# Patient Record
Sex: FEMALE | Race: WHITE | NOT HISPANIC OR LATINO | Employment: FULL TIME | URBAN - METROPOLITAN AREA
[De-identification: names, ages, dates, MRNs, and addresses within clinical notes are randomized per-mention and may not be internally consistent; named-entity substitution may affect disease eponyms.]

---

## 2020-07-19 ENCOUNTER — APPOINTMENT (EMERGENCY)
Dept: RADIOLOGY | Facility: HOSPITAL | Age: 55
End: 2020-07-19

## 2020-07-19 ENCOUNTER — HOSPITAL ENCOUNTER (EMERGENCY)
Facility: HOSPITAL | Age: 55
Discharge: HOME | End: 2020-07-19
Attending: EMERGENCY MEDICINE | Admitting: EMERGENCY MEDICINE

## 2020-07-19 VITALS
HEIGHT: 60 IN | DIASTOLIC BLOOD PRESSURE: 78 MMHG | RESPIRATION RATE: 14 BRPM | SYSTOLIC BLOOD PRESSURE: 126 MMHG | BODY MASS INDEX: 27.14 KG/M2 | WEIGHT: 138.23 LBS | HEART RATE: 68 BPM | TEMPERATURE: 98.3 F | OXYGEN SATURATION: 99 %

## 2020-07-19 DIAGNOSIS — R07.9 CHEST PAIN: Primary | ICD-10-CM

## 2020-07-19 LAB
ALBUMIN SERPL BCP-MCNC: 4.8 G/DL (ref 3.5–5.7)
ALP SERPL-CCNC: 49 U/L (ref 40–150)
ALT SERPL W P-5'-P-CCNC: 10 U/L (ref 7–52)
ANION GAP SERPL CALCULATED.3IONS-SCNC: 7 MMOL/L (ref 4–13)
AST SERPL W P-5'-P-CCNC: 12 U/L (ref 13–39)
BASOPHILS # BLD AUTO: 0 THOUSANDS/ΜL (ref 0–0.1)
BASOPHILS NFR BLD AUTO: 1 % (ref 0–2)
BILIRUB SERPL-MCNC: 0.6 MG/DL (ref 0.2–1)
BUN SERPL-MCNC: 12 MG/DL (ref 7–25)
CALCIUM SERPL-MCNC: 9.6 MG/DL (ref 8.6–10.5)
CHLORIDE SERPL-SCNC: 107 MMOL/L (ref 98–107)
CO2 SERPL-SCNC: 27 MMOL/L (ref 21–31)
CREAT SERPL-MCNC: 0.85 MG/DL (ref 0.6–1.2)
EOSINOPHIL # BLD AUTO: 0.2 THOUSAND/ΜL (ref 0–0.61)
EOSINOPHIL NFR BLD AUTO: 3 % (ref 0–5)
ERYTHROCYTE [DISTWIDTH] IN BLOOD BY AUTOMATED COUNT: 13.2 % (ref 11.5–14.5)
GFR SERPL CREATININE-BSD FRML MDRD: 79 ML/MIN/1.73SQ M
GLUCOSE SERPL-MCNC: 89 MG/DL (ref 65–99)
HCT VFR BLD AUTO: 42.8 % (ref 42–47)
HGB BLD-MCNC: 14.3 G/DL (ref 12–16)
LYMPHOCYTES # BLD AUTO: 3.1 THOUSANDS/ΜL (ref 0.6–4.47)
LYMPHOCYTES NFR BLD AUTO: 35 % (ref 21–51)
MCH RBC QN AUTO: 32.3 PG (ref 26–34)
MCHC RBC AUTO-ENTMCNC: 33.4 G/DL (ref 31–37)
MCV RBC AUTO: 97 FL (ref 81–99)
MONOCYTES # BLD AUTO: 0.5 THOUSAND/ΜL (ref 0.17–1.22)
MONOCYTES NFR BLD AUTO: 6 % (ref 2–12)
NEUTROPHILS # BLD AUTO: 5 THOUSANDS/ΜL (ref 1.4–6.5)
NEUTS SEG NFR BLD AUTO: 56 % (ref 42–75)
PLATELET # BLD AUTO: 253 THOUSANDS/UL (ref 149–390)
PMV BLD AUTO: 8.1 FL (ref 8.6–11.7)
POTASSIUM SERPL-SCNC: 4 MMOL/L (ref 3.5–5.5)
PROT SERPL-MCNC: 7.2 G/DL (ref 6.4–8.9)
RBC # BLD AUTO: 4.42 MILLION/UL (ref 3.9–5.2)
SODIUM SERPL-SCNC: 141 MMOL/L (ref 134–143)
TROPONIN I SERPL-MCNC: <0.03 NG/ML
WBC # BLD AUTO: 8.9 THOUSAND/UL (ref 4.8–10.8)

## 2020-07-19 PROCEDURE — 84484 ASSAY OF TROPONIN QUANT: CPT | Performed by: EMERGENCY MEDICINE

## 2020-07-19 PROCEDURE — 36415 COLL VENOUS BLD VENIPUNCTURE: CPT

## 2020-07-19 PROCEDURE — 85025 COMPLETE CBC W/AUTO DIFF WBC: CPT | Performed by: EMERGENCY MEDICINE

## 2020-07-19 PROCEDURE — 80053 COMPREHEN METABOLIC PANEL: CPT | Performed by: EMERGENCY MEDICINE

## 2020-07-19 PROCEDURE — 99285 EMERGENCY DEPT VISIT HI MDM: CPT | Performed by: PHYSICIAN ASSISTANT

## 2020-07-19 PROCEDURE — 93005 ELECTROCARDIOGRAM TRACING: CPT

## 2020-07-19 PROCEDURE — 99285 EMERGENCY DEPT VISIT HI MDM: CPT

## 2020-07-19 PROCEDURE — 71045 X-RAY EXAM CHEST 1 VIEW: CPT

## 2020-07-19 RX ORDER — CYCLOBENZAPRINE HCL 10 MG
10 TABLET ORAL ONCE
Status: COMPLETED | OUTPATIENT
Start: 2020-07-19 | End: 2020-07-19

## 2020-07-19 RX ADMIN — CYCLOBENZAPRINE HYDROCHLORIDE 10 MG: 10 TABLET, FILM COATED ORAL at 20:58

## 2020-07-20 LAB
ATRIAL RATE: 64 BPM
P AXIS: 57 DEGREES
PR INTERVAL: 152 MS
QRS AXIS: 16 DEGREES
QRSD INTERVAL: 78 MS
QT INTERVAL: 446 MS
QTC INTERVAL: 460 MS
T WAVE AXIS: 35 DEGREES
VENTRICULAR RATE: 64 BPM

## 2020-07-20 PROCEDURE — 93010 ELECTROCARDIOGRAM REPORT: CPT | Performed by: INTERNAL MEDICINE

## 2020-07-20 NOTE — ED PROVIDER NOTES
History  Chief Complaint   Patient presents with    Chest Pain     According to the patient, she has had chest pain that radiates down left arm started around 11 am   Patient denies nausea  episodes of emesis     72-year-old female history of anxiety current half a pack-a-day smoker presents complaining of chest pain  She states that it started this morning at approximately 11:00 a m  And is sharp in nature on the left side of her chest with radiation into left shoulder  She states that she has had chest pain previously but has never been this bad  She reports a history of anxiety attacks but states she has not had 1 in a few weeks  She reports recently being started on Paxil but took it for 1 day and had a panic attack and has not had anything to control her anxiety since  Denies any fevers, chills, cough, shortness of breath, abdominal pain or any other complaints at this time  None       History reviewed  No pertinent past medical history  Past Surgical History:   Procedure Laterality Date    KNEE SURGERY Right     PARTIAL HYSTERECTOMY         History reviewed  No pertinent family history  I have reviewed and agree with the history as documented  E-Cigarette/Vaping    E-Cigarette Use Never User      E-Cigarette/Vaping Substances     Social History     Tobacco Use    Smoking status: Current Every Day Smoker     Packs/day: 0 50     Types: Cigarettes    Smokeless tobacco: Never Used   Substance Use Topics    Alcohol use: Yes     Frequency: Monthly or less     Drinks per session: 1 or 2    Drug use: Never       Review of Systems   Constitutional: Negative for chills, fatigue and fever  HENT: Negative for ear pain and sore throat  Eyes: Negative for pain  Respiratory: Negative for cough, shortness of breath and wheezing  Cardiovascular: Positive for chest pain  Negative for palpitations and leg swelling     Gastrointestinal: Negative for abdominal pain, constipation, diarrhea, nausea and vomiting  Endocrine: Negative for polyuria  Genitourinary: Negative for dysuria and pelvic pain  Musculoskeletal: Negative for arthralgias, myalgias, neck pain and neck stiffness  Skin: Negative for rash  Neurological: Negative for dizziness, syncope, light-headedness and headaches  All other systems reviewed and are negative  Physical Exam  Physical Exam   Constitutional: She is oriented to person, place, and time  She appears well-developed and well-nourished  HENT:   Head: Normocephalic and atraumatic  Mouth/Throat: No oropharyngeal exudate  Eyes: EOM are normal    Neck: Normal range of motion  Cardiovascular: Normal rate, regular rhythm and normal heart sounds  Pulmonary/Chest: Effort normal and breath sounds normal    Abdominal: Soft  Bowel sounds are normal  There is no tenderness  Musculoskeletal: Normal range of motion  Neurological: She is alert and oriented to person, place, and time  Skin: Skin is warm  Capillary refill takes less than 2 seconds  Psychiatric: Her mood appears anxious         Vital Signs  ED Triage Vitals [07/19/20 1920]   Temperature Pulse Respirations Blood Pressure SpO2   98 3 °F (36 8 °C) 73 18 154/92 98 %      Temp Source Heart Rate Source Patient Position - Orthostatic VS BP Location FiO2 (%)   Temporal Monitor Lying Right arm --      Pain Score       6           Vitals:    07/19/20 1920   BP: 154/92   Pulse: 73   Patient Position - Orthostatic VS: Lying         Visual Acuity      ED Medications  Medications   cyclobenzaprine (FLEXERIL) tablet 10 mg (has no administration in time range)       Diagnostic Studies  Results Reviewed     Procedure Component Value Units Date/Time    Troponin I [[id number]]  (Normal) Collected:  07/19/20 1922    Lab Status:  Final result Specimen:  Blood from Arm, Right Updated:  07/19/20 1949     Troponin I <0 03 ng/mL     Comprehensive metabolic panel [[id number]]  (Abnormal) Collected:  07/19/20 1922    Lab Status:  Final result Specimen:  Blood from Arm, Right Updated:  07/19/20 1945     Sodium 141 mmol/L      Potassium 4 0 mmol/L      Chloride 107 mmol/L      CO2 27 mmol/L      ANION GAP 7 mmol/L      BUN 12 mg/dL      Creatinine 0 85 mg/dL      Glucose 89 mg/dL      Calcium 9 6 mg/dL      AST 12 U/L      ALT 10 U/L      Alkaline Phosphatase 49 U/L      Total Protein 7 2 g/dL      Albumin 4 8 g/dL      Total Bilirubin 0 60 mg/dL      eGFR 79 ml/min/1 73sq m     Narrative:       National Kidney Disease Foundation guidelines for Chronic Kidney Disease (CKD):     Stage 1 with normal or high GFR (GFR > 90 mL/min/1 73 square meters)    Stage 2 Mild CKD (GFR = 60-89 mL/min/1 73 square meters)    Stage 3A Moderate CKD (GFR = 45-59 mL/min/1 73 square meters)    Stage 3B Moderate CKD (GFR = 30-44 mL/min/1 73 square meters)    Stage 4 Severe CKD (GFR = 15-29 mL/min/1 73 square meters)    Stage 5 End Stage CKD (GFR <15 mL/min/1 73 square meters)  Note: GFR calculation is accurate only with a steady state creatinine    CBC and differential [[id number]]  (Abnormal) Collected:  07/19/20 1922    Lab Status:  Final result Specimen:  Blood from Arm, Right Updated:  07/19/20 1928     WBC 8 90 Thousand/uL      RBC 4 42 Million/uL      Hemoglobin 14 3 g/dL      Hematocrit 42 8 %      MCV 97 fL      MCH 32 3 pg      MCHC 33 4 g/dL      RDW 13 2 %      MPV 8 1 fL      Platelets 872 Thousands/uL      Neutrophils Relative 56 %      Lymphocytes Relative 35 %      Monocytes Relative 6 %      Eosinophils Relative 3 %      Basophils Relative 1 %      Neutrophils Absolute 5 00 Thousands/µL      Lymphocytes Absolute 3 10 Thousands/µL      Monocytes Absolute 0 50 Thousand/µL      Eosinophils Absolute 0 20 Thousand/µL      Basophils Absolute 0 00 Thousands/µL                  XR chest 1 view portable    (Results Pending)              Procedures  ECG 12 Lead Documentation Only  Date/Time: 7/19/2020 8:43 PM  Performed by:  Marsha Roblero FELICITAS  Authorized by: Bong Long PA-C     ECG reviewed by me, the ED Provider: yes    Patient location:  ED  Previous ECG:     Previous ECG:  Compared to current    Similarity:  No change    Comparison to cardiac monitor: Yes    Interpretation:     Interpretation: normal    Rate:     ECG rate:  64    ECG rate assessment: normal    Rhythm:     Rhythm: sinus rhythm    Ectopy:     Ectopy: none    QRS:     QRS axis:  Normal  Conduction:     Conduction: normal    ST segments:     ST segments:  Normal  T waves:     T waves: normal    Comments:      No evidence of acute cardiac ischemia             ED Course       US AUDIT      Most Recent Value   Initial Alcohol Screen: US AUDIT-C    1  How often do you have a drink containing alcohol? 1 Filed at: 07/19/2020 1917   2  How many drinks containing alcohol do you have on a typical day you are drinking? 1 Filed at: 07/19/2020 1917   3b  FEMALE Any Age, or MALE 65+: How often do you have 4 or more drinks on one occassion? 0 Filed at: 07/19/2020 1917   Audit-C Score  2 Filed at: 07/19/2020 1917            HEART Risk Score      Most Recent Value   Heart Score Risk Calculator   History  1 Filed at: 07/19/2020 2031   ECG  0 Filed at: 07/19/2020 2031   Age  1 Filed at: 07/19/2020 2031   Risk Factors  1 Filed at: 07/19/2020 2031   Troponin  0 Filed at: 07/19/2020 2031   HEART Score  3 Filed at: 07/19/2020 2031            MARISABEL/DAST-10      Most Recent Value   How many times in the past year have you    Used an illegal drug or used a prescription medication for non-medical reasons? Never Filed at: 07/19/2020 1917                                MDM  Number of Diagnoses or Management Options  Chest pain:   Diagnosis management comments: Patient complained of chest pain upon initial presentation  EKG showed no signs of acute cardiac ischemia    Patient's troponin was negative and given that symptoms have remained unchanged and continuous for greater than 4 hours a second troponin is not necessary at this time  Patient has an extensive history of anxiety attacks and she appeared very anxious on exam   But declined anything to treat her anxiety  She reports that she thinks she might have pulled a muscle and would be agreeable to a muscle relaxer  The patient has a HEART score of 3 and at this time is deemed safe for discharge  Lengthy discussion with patient regarding strong return precautions if symptoms significantly change or worsen  Recommend follow-up with family doctor  All of the patients questions were answered, and they are agreeable to plan  Disposition  Final diagnoses:   Chest pain     Time reflects when diagnosis was documented in both MDM as applicable and the Disposition within this note     Time User Action Codes Description Comment    7/19/2020  8:33 PM [address]  [R07 9] Chest pain       ED Disposition     ED Disposition Condition Date/Time Comment    Discharge Stable Sun Jul 19, 2020  8:33 PM Lydia Pulido discharge to home/self care  Follow-up Information    None         Patient's Medications    No medications on file     No discharge procedures on file      PDMP Review     None          ED Provider  Electronically Signed by           Bre Humphrey PA-C  07/19/20 2040

## 2020-07-20 NOTE — DISCHARGE INSTRUCTIONS
Follow-up with family doctor  Return to the ER if your chest pain does not improve or significantly worsens

## 2020-10-25 PROBLEM — F41.9 ANXIETY: Status: ACTIVE | Noted: 2020-05-18

## 2020-10-25 PROBLEM — R07.9 CHEST PAIN: Status: ACTIVE | Noted: 2020-10-22

## 2020-10-25 RX ORDER — FLUTICASONE PROPIONATE 50 MCG
2 SPRAY, SUSPENSION (ML) NASAL DAILY PRN
COMMUNITY
Start: 2020-05-18 | End: 2022-01-07

## 2020-10-25 RX ORDER — LORATADINE 10 MG/1
10 TABLET ORAL DAILY
COMMUNITY

## 2020-10-26 ENCOUNTER — OFFICE VISIT (OUTPATIENT)
Dept: FAMILY MEDICINE CLINIC | Facility: CLINIC | Age: 55
End: 2020-10-26
Payer: COMMERCIAL

## 2020-10-26 VITALS
BODY MASS INDEX: 25.52 KG/M2 | TEMPERATURE: 99 F | HEIGHT: 60 IN | OXYGEN SATURATION: 97 % | SYSTOLIC BLOOD PRESSURE: 110 MMHG | WEIGHT: 130 LBS | HEART RATE: 74 BPM | DIASTOLIC BLOOD PRESSURE: 72 MMHG | RESPIRATION RATE: 16 BRPM

## 2020-10-26 DIAGNOSIS — F41.0 PANIC DISORDER: ICD-10-CM

## 2020-10-26 DIAGNOSIS — Z00.00 ANNUAL PHYSICAL EXAM: Primary | ICD-10-CM

## 2020-10-26 DIAGNOSIS — R42 VERTIGO: ICD-10-CM

## 2020-10-26 DIAGNOSIS — Z12.31 ENCOUNTER FOR SCREENING MAMMOGRAM FOR BREAST CANCER: ICD-10-CM

## 2020-10-26 DIAGNOSIS — F17.200 TOBACCO USE DISORDER: ICD-10-CM

## 2020-10-26 DIAGNOSIS — R07.9 CHEST PAIN, UNSPECIFIED TYPE: ICD-10-CM

## 2020-10-26 PROCEDURE — 99386 PREV VISIT NEW AGE 40-64: CPT | Performed by: INTERNAL MEDICINE

## 2020-10-26 PROCEDURE — 99214 OFFICE O/P EST MOD 30 MIN: CPT | Performed by: INTERNAL MEDICINE

## 2020-10-26 RX ORDER — HYDROXYZINE PAMOATE 25 MG/1
25 CAPSULE ORAL 3 TIMES DAILY PRN
Qty: 30 CAPSULE | Refills: 1 | Status: SHIPPED | OUTPATIENT
Start: 2020-10-26

## 2020-10-29 ENCOUNTER — HOSPITAL ENCOUNTER (OUTPATIENT)
Dept: MAMMOGRAPHY | Facility: HOSPITAL | Age: 55
Discharge: HOME | End: 2020-10-29
Attending: INTERNAL MEDICINE
Payer: COMMERCIAL

## 2020-10-29 VITALS — HEIGHT: 60 IN | WEIGHT: 130 LBS | BODY MASS INDEX: 25.52 KG/M2

## 2020-10-29 DIAGNOSIS — Z12.31 ENCOUNTER FOR SCREENING MAMMOGRAM FOR BREAST CANCER: ICD-10-CM

## 2020-10-29 PROCEDURE — 77063 BREAST TOMOSYNTHESIS BI: CPT

## 2020-10-29 PROCEDURE — 77067 SCR MAMMO BI INCL CAD: CPT

## 2022-01-07 ENCOUNTER — OFFICE VISIT (OUTPATIENT)
Dept: FAMILY MEDICINE CLINIC | Facility: CLINIC | Age: 57
End: 2022-01-07

## 2022-01-07 VITALS
DIASTOLIC BLOOD PRESSURE: 90 MMHG | BODY MASS INDEX: 26.31 KG/M2 | HEART RATE: 84 BPM | HEIGHT: 60 IN | TEMPERATURE: 97.4 F | OXYGEN SATURATION: 99 % | WEIGHT: 134 LBS | SYSTOLIC BLOOD PRESSURE: 132 MMHG

## 2022-01-07 DIAGNOSIS — Z12.31 ENCOUNTER FOR SCREENING MAMMOGRAM FOR BREAST CANCER: ICD-10-CM

## 2022-01-07 DIAGNOSIS — Z13.220 SCREENING FOR HYPERLIPIDEMIA: ICD-10-CM

## 2022-01-07 DIAGNOSIS — Z23 ENCOUNTER FOR IMMUNIZATION: ICD-10-CM

## 2022-01-07 DIAGNOSIS — Z13.1 SCREENING FOR DIABETES MELLITUS: ICD-10-CM

## 2022-01-07 DIAGNOSIS — Z12.11 SCREENING FOR COLORECTAL CANCER: ICD-10-CM

## 2022-01-07 DIAGNOSIS — Z11.4 SCREENING FOR HIV (HUMAN IMMUNODEFICIENCY VIRUS): ICD-10-CM

## 2022-01-07 DIAGNOSIS — Z11.59 NEED FOR HEPATITIS C SCREENING TEST: ICD-10-CM

## 2022-01-07 DIAGNOSIS — Z12.12 SCREENING FOR COLORECTAL CANCER: ICD-10-CM

## 2022-01-07 DIAGNOSIS — Z12.31 BREAST CANCER SCREENING BY MAMMOGRAM: ICD-10-CM

## 2022-01-07 DIAGNOSIS — F41.9 ANXIETY: Primary | ICD-10-CM

## 2022-01-07 DIAGNOSIS — Z12.4 SCREENING FOR CERVICAL CANCER: ICD-10-CM

## 2022-01-07 PROCEDURE — 99214 OFFICE O/P EST MOD 30 MIN: CPT | Performed by: FAMILY MEDICINE

## 2022-01-07 PROCEDURE — 90715 TDAP VACCINE 7 YRS/> IM: CPT

## 2022-01-07 PROCEDURE — 90471 IMMUNIZATION ADMIN: CPT

## 2022-01-07 RX ORDER — SERTRALINE HYDROCHLORIDE 25 MG/1
25 TABLET, FILM COATED ORAL DAILY
Qty: 30 TABLET | Refills: 0 | Status: SHIPPED | OUTPATIENT
Start: 2022-01-07

## 2022-01-07 RX ORDER — HYDROXYZINE HYDROCHLORIDE 25 MG/1
25 TABLET, FILM COATED ORAL EVERY 6 HOURS PRN
Qty: 30 TABLET | Refills: 0 | Status: SHIPPED | OUTPATIENT
Start: 2022-01-07

## 2022-01-07 RX ORDER — MAGNESIUM OXIDE/MAG AA CHELATE 133 MG
1 TABLET ORAL 2 TIMES DAILY
COMMUNITY

## 2022-01-07 RX ORDER — OMEGA-3S/DHA/EPA/FISH OIL/D3 300MG-1000
400 CAPSULE ORAL DAILY
COMMUNITY

## 2022-01-07 RX ORDER — RIBOFLAVIN (VITAMIN B2) 100 MG
100 TABLET ORAL DAILY
COMMUNITY

## 2022-01-07 NOTE — PROGRESS NOTES
Assessment/Plan:    Anxiety  Had a long discussion with patient about treatment options  Will start Zoloft daily  Side effects discussed  Will also give Atarax to be used p r n     Advised to consider CBT  Follow-up in 4 weeks  Problem List Items Addressed This Visit        Other    Anxiety - Primary     Had a long discussion with patient about treatment options  Will start Zoloft daily  Side effects discussed  Will also give Atarax to be used p r n     Advised to consider CBT  Follow-up in 4 weeks  Relevant Medications    sertraline (Zoloft) 25 mg tablet    hydrOXYzine HCL (ATARAX) 25 mg tablet      Other Visit Diagnoses     Breast cancer screening by mammogram        Need for hepatitis C screening test        Relevant Orders    Hepatitis C Antibody ([facility])    Screening for HIV (human immunodeficiency virus)        Relevant Orders    HIV 1/2 Antigen/Antibody (4th Generation) w Reflex [facility]    Encounter for immunization        Relevant Orders    TDAP VACCINE GREATER THAN OR EQUAL TO 8YO IM (Completed)    Screening for cervical cancer        Relevant Orders    Ambulatory referral to Obstetrics / Gynecology    Screening for colorectal cancer        Relevant Orders    Ambulatory referral to Gastroenterology    Encounter for screening mammogram for breast cancer        Relevant Orders    Mammo screening bilateral w 3d & cad    Screening for diabetes mellitus        Relevant Orders    Comprehensive metabolic panel    Screening for hyperlipidemia        Relevant Orders    Lipid panel            Subjective:      Patient ID: Art Heredia is a 49 y o  female  Presents to the office to establish care  Has history of anxiety  Anxiety has been worse in the last year due to trouble with her children  Has tried hydroxyzine with some relief in the past   Anxiety is always  Denies any depression or brittni  Her anxiety is causing her trouble with sleeping  Having difficulty falling asleep  Feels like mind is always racing  Has tried Paxil in the past but did not like how it made her feel  She felt like she was on [employer]  Is active at work and says she cannot feel like that at work to avoid injury  The following portions of the patient's history were reviewed and updated as appropriate: allergies, current medications, past family history, past medical history, past social history, past surgical history and problem list     Review of Systems   Constitutional: Negative for chills and fever  HENT: Negative for ear pain and sore throat  Eyes: Negative for pain and visual disturbance  Respiratory: Negative for cough and shortness of breath  Cardiovascular: Negative for chest pain and palpitations  Gastrointestinal: Negative for abdominal pain and vomiting  Genitourinary: Negative for dysuria and hematuria  Musculoskeletal: Negative for arthralgias and back pain  Skin: Negative for color change and rash  Neurological: Negative for seizures and syncope  Psychiatric/Behavioral: Negative for behavioral problems, decreased concentration, sleep disturbance and suicidal ideas  The patient is nervous/anxious  All other systems reviewed and are negative  Objective:      /90 (BP Location: Left arm, Patient Position: Sitting, Cuff Size: Standard)   Pulse 84   Temp (!) 97 4 °F (36 3 °C) (Tympanic)   Ht 5' (1 524 m)   Wt 60 8 kg (134 lb)   SpO2 99%   BMI 26 17 kg/m²          Physical Exam  Vitals and nursing note reviewed  Constitutional:       General: She is not in acute distress  Appearance: She is well-developed  She is not ill-appearing, toxic-appearing or diaphoretic  Pulmonary:      Effort: Pulmonary effort is normal    Neurological:      Mental Status: She is alert

## 2022-01-10 NOTE — ASSESSMENT & PLAN NOTE
Had a long discussion with patient about treatment options  Will start Zoloft daily  Side effects discussed  Will also give Atarax to be used p r n     Advised to consider CBT  Follow-up in 4 weeks

## 2022-01-13 ENCOUNTER — TELEMEDICINE (OUTPATIENT)
Dept: FAMILY MEDICINE CLINIC | Facility: CLINIC | Age: 57
End: 2022-01-13
Payer: COMMERCIAL

## 2022-01-13 DIAGNOSIS — F41.9 ANXIETY: Primary | ICD-10-CM

## 2022-01-13 PROCEDURE — 99213 OFFICE O/P EST LOW 20 MIN: CPT | Performed by: FAMILY MEDICINE

## 2022-01-14 NOTE — ASSESSMENT & PLAN NOTE
Recommended to continue Zoloft  Do not believe patient's symptoms are related directly to Zoloft as it is not a known side effect  Recommended to continue monitor for symptoms and if symptoms become uncomfortable or unresolved after another week or 2 on Zoloft to call me back and we will try something else  She does feel her anxiety is starting to get better since we started Zoloft  Do not believe her chest pain is cardiac in origin it is not typical anginal pain   ACS precautions were discussed with patient advised ED if any were to arise

## 2022-01-14 NOTE — PROGRESS NOTES
Virtual Regular Visit    Verification of patient location:    Patient is located in the following state in which I hold an active license [place]      Assessment/Plan:    Problem List Items Addressed This Visit        Other    Anxiety - Primary     Recommended to continue Zoloft  Do not believe patient's symptoms are related directly to Zoloft as it is not a known side effect  Recommended to continue monitor for symptoms and if symptoms become uncomfortable or unresolved after another week or 2 on Zoloft to call me back and we will try something else  She does feel her anxiety is starting to get better since we started Zoloft  Do not believe her chest pain is cardiac in origin it is not typical anginal pain   ACS precautions were discussed with patient advised ED if any were to arise  Reason for visit is   Chief Complaint   Patient presents with    Virtual Regular Visit        Encounter provider Marce Quick MD    Provider located at [address] [place] [id number]      Recent Visits  Date Type Provider Dept   01/13/22 Telemedicine Marce Quick MD [facility]   01/07/22 Office Visit Marce Quick MD [facility] [address] [place] [address] recent visits within past 7 days and meeting all other requirements  Future Appointments  No visits were found meeting these conditions  Showing future appointments within next 150 days and meeting all other requirements       The patient was identified by name and date of birth  Lita Abreu was informed that this is a telemedicine visit and that the visit is being conducted through Telephone  My office door was closed  No one else was in the room  She acknowledged consent and understanding of privacy and security of the video platform  The patient has agreed to participate and understands they can discontinue the visit at any time      Patient is aware this is a billable service  Jose Huggins is a 49 y o  female     Today's visits follow-up on anxiety and review lab work  Last visit started Zoloft 25 mg daily  Patient is feeling some chest discomfort since starting it  Denies shortness of breath diaphoresis nausea vomiting abdominal pain  Chest pain is not related to exertion or activity  Denies feeling of substernal chest pressure  Her chest pain is hard to describe  Has a pretty active job and has not had to stop and rest during the chest pain  Pain is not worse with movement  Last visit was to establish care  Ordered screening blood work  Lab work was unremarkable         Past Medical History:   Diagnosis Date    Anxiety        Past Surgical History:   Procedure Laterality Date    HYSTERECTOMY      KNEE SURGERY Right     PARTIAL HYSTERECTOMY         Current Outpatient Medications   Medication Sig Dispense Refill    Ascorbic Acid (vitamin C) 100 MG tablet Take 100 mg by mouth daily      cholecalciferol (VITAMIN D3) 400 units tablet Take 400 Units by mouth daily      cyanocobalamin (VITAMIN B-12) 100 MCG tablet Take 100 mcg by mouth daily      fluticasone (FLONASE) 50 mcg/act nasal spray 2 sprays into each nostril daily as needed        hydrOXYzine HCL (ATARAX) 25 mg tablet Take 1 tablet (25 mg total) by mouth every 6 (six) hours as needed for itching 30 tablet 0    hydrOXYzine pamoate (VISTARIL) 25 mg capsule Take 1 capsule (25 mg total) by mouth 3 (three) times a day as needed for anxiety (Patient not taking: Reported on 1/7/2022 ) 30 capsule 1    loratadine (CLARITIN) 10 mg tablet Take 10 mg by mouth daily (Patient not taking: Reported on 1/7/2022 )      Misc Natural Products (ELDERBERRY ZINC/VIT C/IMMUNE MT) Apply to the mouth or throat      sertraline (Zoloft) 25 mg tablet Take 1 tablet (25 mg total) by mouth daily 30 tablet 0    Specialty Vitamins Products (magnesium, amino acid chelate,) 133 MG tablet Take 1 tablet by mouth 2 (two) times a day       No current facility-administered medications for this visit  Allergies   Allergen Reactions    Aspirin Other (See Comments)     nose bleeds  Other reaction(s): nose bleeds    Penicillins        Review of Systems    Video Exam    There were no vitals filed for this visit  Physical Exam         VIRTUAL VISIT DISCLAIMER      Lydia Card verbally agrees to participate in [employer]  Pt is aware that [employer] could be limited without vital signs or the ability to perform a full hands-on physical exam  Lydia Card understands she or the provider may request at any time to terminate the video visit and request the patient to seek care or treatment in person

## 2023-09-29 ENCOUNTER — TELEPHONE (OUTPATIENT)
Dept: FAMILY MEDICINE CLINIC | Facility: CLINIC | Age: 58
End: 2023-09-29

## 2023-09-29 NOTE — TELEPHONE ENCOUNTER
Called pt to set up an annual. Pt informed me that she will not be returning due to insurance change and she is now seeing an [phone].

## 2023-09-29 NOTE — TELEPHONE ENCOUNTER
09/29/23 12:30 PM        The office's request has been received, reviewed, and the patient chart updated. The PCP has successfully been removed with a patient attribution note. This message will now be completed.         Thank you  Silverio Purvis